# Patient Record
Sex: MALE | Race: WHITE
[De-identification: names, ages, dates, MRNs, and addresses within clinical notes are randomized per-mention and may not be internally consistent; named-entity substitution may affect disease eponyms.]

---

## 2020-10-11 ENCOUNTER — HOSPITAL ENCOUNTER (EMERGENCY)
Dept: HOSPITAL 41 - JD.ED | Age: 60
Discharge: HOME | End: 2020-10-11
Payer: COMMERCIAL

## 2020-10-11 DIAGNOSIS — E66.9: ICD-10-CM

## 2020-10-11 DIAGNOSIS — I10: ICD-10-CM

## 2020-10-11 DIAGNOSIS — F41.9: Primary | ICD-10-CM

## 2020-10-11 DIAGNOSIS — R63.0: ICD-10-CM

## 2020-10-11 DIAGNOSIS — D64.89: ICD-10-CM

## 2020-10-11 PROCEDURE — 96374 THER/PROPH/DIAG INJ IV PUSH: CPT

## 2020-10-11 PROCEDURE — 93005 ELECTROCARDIOGRAM TRACING: CPT

## 2020-10-11 PROCEDURE — 99283 EMERGENCY DEPT VISIT LOW MDM: CPT

## 2020-10-11 PROCEDURE — 80053 COMPREHEN METABOLIC PANEL: CPT

## 2020-10-11 PROCEDURE — 83690 ASSAY OF LIPASE: CPT

## 2020-10-11 PROCEDURE — 36415 COLL VENOUS BLD VENIPUNCTURE: CPT

## 2020-10-11 PROCEDURE — 85025 COMPLETE CBC W/AUTO DIFF WBC: CPT

## 2020-10-11 NOTE — EDM.PDOC
ED HPI GENERAL MEDICAL PROBLEM





- General


Chief Complaint: General


Stated Complaint: FEELS LIKE BODY IS RACING/UNABLE TO EAT


Time Seen by Provider: 10/11/20 02:45


Source of Information: Reports: Patient


History Limitations: Reports: No Limitations





- History of Present Illness


INITIAL COMMENTS - FREE TEXT/NARRATIVE: 





The patient presents with anxiety and loss of appetite.  The patient has been 

doctoring with Jazmin Mcnally and she did a CT scan and there was evidence of 

extensive malignancy involving the lung, liver and lymph nodes.  Lymphoma and 

pancreatic cancers were on the differentials.  The patient is going to see an 

oncologist but he has not found out who or when yet.  He has not been able to 

eat.  He says he gets full with just a few bites and he may be a little nause

ous.  He has no fever, chills, cough, chest pain or shortness of breath.  He 

says he feels like his body is racing.  He feels a little anxious.


Onset: Gradual


Duration: Day(s):


Severity: Moderate


Improves with: Reports: None


Worsens with: Reports: None


Associated Symptoms: Reports: Loss of Appetite.  Denies: Chest Pain, Cough, 

Fever/Chills, Headaches, Nausea/Vomiting, Shortness of Breath





- Related Data


                                    Allergies











Allergy/AdvReac Type Severity Reaction Status Date / Time


 


No Known Allergies Allergy   Verified 04/09/19 11:32











Home Meds: 


                                    Home Meds





. [No Known Home Meds]  04/09/19 [History]











Past Medical History


HEENT History: Reports: Impaired Vision


Cardiovascular History: Reports: Hypertension


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 

excised)


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 

aprox 16 years ago.


Endocrine/Metabolic History: Reports: Obesity/BMI 30+





- Past Surgical History


Head Surgeries/Procedures: Reports: Other (See Below)


HEENT Surgical History: Reports: Oral Surgery





Social & Family History





- Family History


Family Medical History: Noncontributory





- Tobacco Use


Smoking Status *Q: Never Smoker





- Caffeine Use


Caffeine Use: Reports: None





- Recreational Drug Use


Recreational Drug Use: No





- Living Situation & Occupation


Living situation: Reports: Alone, Single


Occupation: Employed (Fills tanks)





ED ROS GENERAL





- Review of Systems


Review Of Systems: See Below


Constitutional: Reports: No Symptoms


HEENT: Reports: No Symptoms


Respiratory: Reports: No Symptoms


Cardiovascular: Reports: No Symptoms


Endocrine: Reports: No Symptoms


GI/Abdominal: Reports: Decreased Appetite, Nausea.  Denies: Abdominal Pain, 

Vomiting


: Reports: No Symptoms





ED EXAM, GENERAL





- Physical Exam


Exam: See Below


Exam Limited By: No Limitations


General Appearance: Alert, No Apparent Distress


Ears: Normal External Exam


Nose: Normal Inspection


Head: Atraumatic, Normocephalic


Neck: Normal Inspection


Respiratory/Chest: No Respiratory Distress, Lungs Clear, Normal Breath Sounds


Cardiovascular: Regular Rate, Rhythm, No Edema, No Murmur


GI/Abdominal: Soft, Non-Tender, No Organomegaly, No Mass


Back Exam: Normal Inspection


Extremities: Normal Inspection





EKG INTERPRETATION


EKG Date: 10/11/20


Time: 03:05


Rhythm: Other (sinus tachycardia)


Rate (Beats/Min): 111


Axis: Normal


P-Wave: Present


QRS: Normal


ST-T: Normal


QT: Normal





Course





- Vital Signs


Last Recorded V/S: 


                                Last Vital Signs











Temp  99.3 F   10/11/20 02:28


 


Pulse  120 H  10/11/20 02:28


 


Resp  16   10/11/20 02:28


 


BP  142/84 H  10/11/20 02:28


 


Pulse Ox  94 L  10/11/20 02:28














- Orders/Labs/Meds


Orders: 


                               Active Orders 24 hr











 Category Date Time Status


 


 EKG Documentation Completion [RC] STAT Care  10/11/20 02:52 Active


 


 ED Antiemetic Medication Reflex [OM.PC] Stat Oth  10/11/20 02:52 Ordered











Labs: 


                                Laboratory Tests











  10/11/20 10/11/20 Range/Units





  03:00 03:00 


 


WBC  13.60 H   (4.23-9.07)  K/mm3


 


RBC  4.18 L   (4.63-6.08)  M/mm3


 


Hgb  9.9 L D   (13.7-17.5)  gm/dl


 


Hct  31.6 L   (40.1-51.0)  %


 


MCV  75.6 L   (79.0-92.2)  fl


 


MCH  23.7 L   (25.7-32.2)  pg


 


MCHC  31.3 L   (32.2-35.5)  g/dl


 


RDW Std Deviation  45.3 H   (35.1-43.9)  fL


 


Plt Count  452 H D   (163-337)  K/mm3


 


MPV  8.6 L   (9.4-12.3)  fl


 


Neut % (Auto)  77.9 H   (34.0-67.9)  %


 


Lymph % (Auto)  7.3 L   (21.8-53.1)  %


 


Mono % (Auto)  13.2 H   (5.3-12.2)  %


 


Eos % (Auto)  0.9   (0.8-7.0)  


 


Baso % (Auto)  0.4   (0.1-1.2)  %


 


Neut # (Auto)  10.59 H   (1.78-5.38)  K/mm3


 


Lymph # (Auto)  0.99 L   (1.32-3.57)  K/mm3


 


Mono # (Auto)  1.80 H   (0.30-0.82)  K/mm3


 


Eos # (Auto)  0.12   (0.04-0.54)  K/mm3


 


Baso # (Auto)  0.06   (0.01-0.08)  K/mm3


 


Manual Slide Review  Abnormal smear   


 


Sodium   129 L  (136-145)  mEq/L


 


Potassium   3.7  (3.5-5.1)  mEq/L


 


Chloride   96 L  ()  mEq/L


 


Carbon Dioxide   20 L  (21-32)  mEq/L


 


Anion Gap   16.7 H  (5-15)  


 


BUN   13  (7-18)  mg/dL


 


Creatinine   1.0  (0.7-1.3)  mg/dL


 


Est Cr Clr Drug Dosing   82.13  mL/min


 


Estimated GFR (MDRD)   > 60  (>60)  mL/min


 


BUN/Creatinine Ratio   13.0 L  (14-18)  


 


Glucose   187 H  ()  mg/dL


 


Calcium   8.3 L  (8.5-10.1)  mg/dL


 


Total Bilirubin   0.5  (0.2-1.0)  mg/dL


 


AST   76 H  (15-37)  U/L


 


ALT   65 H  (16-63)  U/L


 


Alkaline Phosphatase   119 H  ()  U/L


 


Total Protein   7.2  (6.4-8.2)  g/dl


 


Albumin   1.7 L  (3.4-5.0)  g/dl


 


Globulin   5.5  gm/dL


 


Albumin/Globulin Ratio   0.3 L  (1-2)  


 


Lipase   156  ()  U/L











Meds: 


Medications














Discontinued Medications














Generic Name Dose Route Start Last Admin





  Trade Name Freq  PRN Reason Stop Dose Admin


 


Lorazepam  1 mg  10/11/20 02:53  10/11/20 02:59





  Ativan  PO  10/11/20 02:54  1 mg





  ONETIME ONE   Administration


 


Ondansetron HCl  4 mg  10/11/20 02:52  10/11/20 02:59





  Zofran  IVPUSH  10/11/20 02:53  4 mg





  ONETIME ONE   Administration














- Re-Assessments/Exams


Free Text/Narrative Re-Assessment/Exam: 





10/11/20 04:00


I ordered an EKG, labs and zofran.  His EKG shows a sinus tachycardia.  His WBC 

was elevated at 13.6.  His Hgb was low at 9.9.  His platelets were elevated at 

452.  His Na was low at 129.  His anion gap was elevated at 16.7.  His glucose 

was elevated at 187.  His AST is elevated at 76.  His ALT is elevated at 65.  

His alk phos is elevated at 119.  His lipase is normal.





Departure





- Departure


Time of Disposition: 04:15


Disposition: Home, Self-Care 01


Condition: Good


Clinical Impression: 


 Decreased appetite, Anxiety





Anemia


Qualifiers:


 Anemia type: other cause Other causes of anemia: other cause, not classified 

Qualified Code(s): D64.89 - Other specified anemias





Leukocytosis


Qualifiers:


 Leukocytosis type: unspecified Qualified Code(s): D72.829 - Elevated white 

blood cell count, unspecified








- Discharge Information


*PRESCRIPTION DRUG MONITORING PROGRAM REVIEWED*: Not Applicable


*COPY OF PRESCRIPTION DRUG MONITORING REPORT IN PATIENT TAMMY: Not Applicable


Referrals: 


Jazmin Mcnally PA-C [Primary Care Provider] - 1 Week


Forms:  ED Department Discharge


Additional Instructions: 


Take the zofran ever 6 hours as needed for nausea and it may help with your 

appetite.  Take the ativan every 8 hours as needed for anxiety.  Follow up with 

Jazmin Mcnally.  Please return if you are worse.





Sepsis Event Note (ED)





- Evaluation


Sepsis Screening Result: No Definite Risk





- Focused Exam


Vital Signs: 


                                   Vital Signs











  Temp Pulse Resp BP Pulse Ox


 


 10/11/20 02:28  99.3 F  120 H  16  142/84 H  94 L














- My Orders


Last 24 Hours: 


My Active Orders





10/11/20 02:52


EKG Documentation Completion [RC] STAT 


ED Antiemetic Medication Reflex [OM.PC] Stat 














- Assessment/Plan


Last 24 Hours: 


My Active Orders





10/11/20 02:52


EKG Documentation Completion [RC] STAT 


ED Antiemetic Medication Reflex [OM.PC] Stat

## 2020-11-10 ENCOUNTER — HOSPITAL ENCOUNTER (EMERGENCY)
Dept: HOSPITAL 41 - JD.ED | Age: 60
LOS: 1 days | Discharge: HOME | End: 2020-11-11
Payer: COMMERCIAL
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
## 2020-11-10 DIAGNOSIS — S01.01XA: Primary | ICD-10-CM

## 2020-11-10 DIAGNOSIS — Z87.891: ICD-10-CM

## 2020-11-10 DIAGNOSIS — Z79.899: ICD-10-CM

## 2020-11-10 DIAGNOSIS — I10: ICD-10-CM

## 2020-11-10 DIAGNOSIS — Z23: ICD-10-CM

## 2020-11-10 DIAGNOSIS — Z79.82: ICD-10-CM

## 2020-11-10 DIAGNOSIS — E78.00: ICD-10-CM

## 2020-11-10 PROCEDURE — 90715 TDAP VACCINE 7 YRS/> IM: CPT

## 2020-11-10 PROCEDURE — 99282 EMERGENCY DEPT VISIT SF MDM: CPT

## 2020-11-10 PROCEDURE — 90471 IMMUNIZATION ADMIN: CPT

## 2020-11-10 PROCEDURE — 12002 RPR S/N/AX/GEN/TRNK2.6-7.5CM: CPT

## 2020-11-10 NOTE — EDM.PDOC
ED HPI GENERAL MEDICAL PROBLEM





- General


Chief Complaint: Laceration


Stated Complaint: FELL OUT OF BED/HEAD LAC


Time Seen by Provider: 11/10/20 23:05


Source of Information: Reports: Patient


History Limitations: Reports: No Limitations





- History of Present Illness


INITIAL COMMENTS - FREE TEXT/NARRATIVE: 





Mr. Adan is a pleasant 60-year-old gentleman who now presents to the ED after 

sustaining a laceration to his left scalp 1/22/2020 tonight, when he 

accidentally fell when he was getting out of bed.  He states that he is being 

prepped for colonoscopy tomorrow, and that he needed to go to the bathroom.  He 

states that he was simply moving too quickly, and fell.  There was no loss of 

consciousness, and the patient is otherwise uninjured.





Here in the ED, the patient is initially found to be tachycardic at 134 bpm, 

otherwise, he is hemodynamically stable, afebrile, saturating 95% on room air.





Other than tonight's scalp injury, the patient denies having a recent fever, c

hills, sore throat, ear pain, nasal or sinus congestion, cough, dyspnea, chest 

pain, palpitations, nausea, vomiting, constipation, diarrhea, abdominal pain, 

urinary symptoms, recent weight gain or weight loss, recent bloody bowel 

movements or black bowel movements, recent joint aches, headaches, or rashes.








The patient's PCP is BEVERLY Nguyen.


His Surgeon is Dr. marita Amaro.


His Oncologist is Dr. Herrera Salazar.








  ** Head


Pain Score (Numeric/FACES): 2





- Related Data


                                    Allergies











Allergy/AdvReac Type Severity Reaction Status Date / Time


 


No Known Allergies Allergy   Verified 11/10/20 23:05











Home Meds: 


                                    Home Meds





Aspirin 81 mg PO DAILY 11/10/20 [History]


Latanoprost [Xalatan 0.005% Ophth Soln] 1 drop EYEBOTH BEDTIME 11/10/20 

[History]


Metoprolol Succinate 100 mg PO BEDTIME 11/10/20 [History]


atorvaSTATin [Lipitor] 80 mg PO BEDTIME 11/10/20 [History]











Past Medical History


HEENT History: Reports: Impaired Vision


Cardiovascular History: Reports: High Cholesterol, Hypertension


Dermatologic History: Reports: Other (See Below) (Cancer of undetermined 

etiology)





- Past Surgical History


Head Surgeries/Procedures: Reports: Other (See Below) (Benign brainstem tumor 

excised in 2003 or 2004)


HEENT Surgical History: Reports: Oral Surgery (dental extractions)


Musculoskeletal Surgical History: Reports: Other (See Below) (Right Achilles 

tendon repair)





Social & Family History





- Family History


Family Medical History: No Pertinent Family History





- Tobacco Use


Tobacco Use Status *Q: Former Tobacco User


Years of Tobacco use: 36


Packs/Tins Daily: 0.3


Month/Year Tobacco Last Used: Quit Oct 2020





- Caffeine Use


Caffeine Use: Reports: None





- Alcohol Use


Alcohol Use History: Yes


Alcohol Use Frequency: Socially





- Recreational Drug Use


Recreational Drug Use: No





- Living Situation & Occupation


Living situation: Reports: Alone, Single


Occupation: Unemployed





ED ROS GENERAL





- Review of Systems


Review Of Systems: Comprehensive ROS is negative, except as noted in HPI.





ED EXAM, SKIN/RASH


Exam: See Below


Exam Limited By: No Limitations


General Appearance: Alert, WD/WN, No Apparent Distress


Eye Exam: Bilateral Eye: EOMI, Normal Inspection


Ears: Normal External Exam, Hearing Grossly Normal


Nose: Normal Inspection


Throat/Mouth: Normal Inspection, Normal Voice, No Airway Compromise


Head: Normocephalic, Other (5 cm "L"-shaped laceration to the patinet's 

posterior left scalp with no associated hematoma.  The wound appears to be full-

thickness, but is not currently bleeding.)


Neck: Normal Inspection, Full Range of Motion





ED SKIN PROCEDURES





- Laceration/Wound Repair


  ** Left Head


Appearance: Subcutaneous, Irregular, Clean


Skin Prep: Saline


Exploration/Debridement/Repair: Wound Explored, In a Bloodless Field, Explored 

to Base, No Foreign Material Found


Closed with: Conner


Lac/Wound length In cm: 5


# of Sutures: 7


Drain Placement: No


Tetanus Status Addressed: Yes


Complications: No





Course





- Vital Signs


Last Recorded V/S: 


                                Last Vital Signs











Temp  36.6 C   11/10/20 23:03


 


Pulse  134 H  11/10/20 23:03


 


Resp  18   11/10/20 23:03


 


BP  130/82   11/10/20 23:03


 


Pulse Ox  95   11/10/20 23:03








                                        





Orthostatic Blood Pressure [     93/64


Standing]                        


Orthostatic Blood Pressure [     98/70


Sitting]                         


Orthostatic Blood Pressure [     102/61


Supine]                          











- Orders/Labs/Meds


Meds: 


Medications














Discontinued Medications














Generic Name Dose Route Start Last Admin





  Trade Name Freq  PRN Reason Stop Dose Admin


 


Diphtheria/Tetanus/Acell Pertussis  0.5 ml  11/10/20 23:24  11/10/20 23:55





  Adacel  IM  11/10/20 23:25  0.5 ml





  .ONCE ONE   Administration


 


Sodium Chloride  1,000 mls @ 999 mls/hr  11/10/20 23:28  11/10/20 23:55





  Normal Saline  IV  11/11/20 00:28  999 mls/hr





  ONETIME ONE   Administration














- Re-Assessments/Exams


Free Text/Narrative Re-Assessment/Exam: 





11/10/20 23:16


As above, the patient fell after getting out of bed around 22:20 this evening, 

striking the left side of his head on a bedside table, sustaining a laceration. 

No loss of consciousness.  He has an approximately 5 cm "L"-shaped laceration to

his left scalp that will require conner after Yaw RN cleans the wound.  No 

other injuries.





Due to these patient's baseline tachycardia, I have asked Yaw DENNEY to check the

patient's orthostatics.








11/10/20 23:21


I placed 7 staples across the wound.  The patient tolerated the procedure very 

well.








11/10/20 23:28


The patient is orthostatic.  I have ordered 1 L of IV fluid, to be followed by 

repeat orthostatics.








11/11/20 01:42


Following 1 L of IV fluid, the patient is no longer orthostatic.  I will 

discharge him home.





Departure





- Departure


Time of Disposition: 01:42


Disposition: Home, Self-Care 01


Condition: Good


Clinical Impression: 


 Scalp laceration, Orthostasis








- Discharge Information


*PRESCRIPTION DRUG MONITORING PROGRAM REVIEWED*: Not Applicable


*COPY OF PRESCRIPTION DRUG MONITORING REPORT IN PATIENT TAMMY: Not Applicable


Instructions:  Orthostatic Hypotension, Laceration Care, Adult, Easy-to-Read


Referrals: 


Jazmin Mcnally PA-C [Primary Care Provider] - 


Marita Amaro MD [Physician] - 


Herrera Salazar MD [Ordering Only Provider] - 


Forms:  ED Department Discharge


Additional Instructions: 


You were seen in the emergency room after getting out of bed, falling, and 

cutting the left side of your head.





Your wound was closed with 7 staples.





Keep the wound clean with ordinary shampoo and water when you bathe.  Do not 

apply antibiotic ointment.





The staples should be ready for removal by Friday, 11/20/2020.  They can be 

removed at the walk-in clinic, by a nurse at your doctor's office, or in the ER.





Your heart rate was found to be elevated.  Your blood pressure was checked both 

lying and standing, finding that it felt excessively between lying and standing,

a condition known as orthostasis.





You were given 1 L of IV fluid, and your blood pressures normalized.





Going forward, we recommend that you stay adequately hydrated.





You may proceed with your scheduled colonoscopy today.





If any other problems, please do not hesitate to return to the ER.








**You were given a tetanus vaccination during your ER visit.**





Sepsis Event Note (ED)





- Evaluation


Sepsis Screening Result: No Definite Risk





- Focused Exam


Vital Signs: 


                                   Vital Signs











  Temp Pulse Resp BP Pulse Ox


 


 11/10/20 23:03  36.6 C  134 H  18  130/82  95

## 2020-11-11 ENCOUNTER — HOSPITAL ENCOUNTER (OUTPATIENT)
Dept: HOSPITAL 41 - JD.SDS | Age: 60
Discharge: HOME | End: 2020-11-11
Attending: SURGERY
Payer: COMMERCIAL

## 2020-11-11 DIAGNOSIS — I85.00: ICD-10-CM

## 2020-11-11 DIAGNOSIS — Z79.899: ICD-10-CM

## 2020-11-11 DIAGNOSIS — K20.90: ICD-10-CM

## 2020-11-11 DIAGNOSIS — D12.5: Primary | ICD-10-CM

## 2020-11-11 DIAGNOSIS — Z98.890: ICD-10-CM

## 2020-11-11 DIAGNOSIS — E78.00: ICD-10-CM

## 2020-11-11 DIAGNOSIS — I10: ICD-10-CM

## 2020-11-11 DIAGNOSIS — K64.1: ICD-10-CM

## 2020-11-11 DIAGNOSIS — Z79.82: ICD-10-CM

## 2020-11-11 PROCEDURE — 45385 COLONOSCOPY W/LESION REMOVAL: CPT

## 2020-11-11 PROCEDURE — 45380 COLONOSCOPY AND BIOPSY: CPT

## 2020-11-11 PROCEDURE — 43235 EGD DIAGNOSTIC BRUSH WASH: CPT

## 2020-11-11 NOTE — PCM.PREANE
Preanesthetic Assessment





- Procedure


Proposed Procedure: 


EGD and Colonoscopy








- Anesthesia/Transfusion/Family Hx


Anesthesia History: Prior Anesthesia Without Reaction





- Review of Systems


General: Weakness


Pulmonary: No Symptoms


Cardiovascular: No Symptoms


Gastrointestinal: No Symptoms


Neurological: Other (Recent (11/10/20) fall with left upper parietal area scalp 

cut, closed with staples, seen in ED )


Other: Reports: None





- Physical Assessment


NPO Status Date: 11/10/20


NPO Status Time: 23:50


Vital Signs: 





                                Last Vital Signs











Temp  97.9 F   11/11/20 11:25


 


Pulse  115 H  11/11/20 11:25


 


Resp  28 H  11/11/20 11:25


 


BP  111/65   11/11/20 11:25


 


Pulse Ox  95   11/11/20 11:25











Height: 1.78 m


Weight: 88.451 kg


ASA Class: 3


Mental Status: Alert & Oriented x3


Airway Class: Mallampati = 3


Dentition: Reports: Hartley(s), Bridge


Thyro-Mental Finger Breadths: 3


Mouth Opening Finger Breadths: 3


ROM/Head Extension: Full


Lungs: Clear to Auscultation, Normal Respiratory Effort


Cardiovascular: Regular Rate, Regular Rhythm





- Allergies


Allergies/Adverse Reactions: 


                                    Allergies











Allergy/AdvReac Type Severity Reaction Status Date / Time


 


No Known Allergies Allergy   Verified 11/10/20 23:05














- Anesthesia Plan


Beta Blocker: Metoprolol


Med Last Dose Date: 11/10/20


Med Last Dose Time: 18:00





- Acknowledgements


Anesthesia Type Planned: MAC


Pt an Appropriate Candidate for the Planned Anesthesia: Yes


Alternatives and Risks of Anesthesia Discussed w Pt/Guardian: Yes


Pt/Guardian Understands and Agrees with Anesthesia Plan: Yes





PreAnesthesia Questionnaire


HEENT History: Reports: Impaired Vision


Cardiovascular History: Reports: High Cholesterol, Hypertension


Gastrointestinal History: Reports: None, Other (See Below)


Other Gastrointestinal History: Pancreatic Cancer


Neurological History: Reports: Other (See Below)


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 

aprox 16 years ago.


Psychiatric History: Reports: None


Endocrine/Metabolic History: Reports: Obesity/BMI 30+


Hematologic History: Reports: None


Immunologic History: Reports: None


Oncologic (Cancer) History: Reports: Pancreatic


Dermatologic History: Reports: Other (See Below) (Cancer of undetermined 

etiology)


Other Dermatologic History: cyst with excision





- Infectious Disease History


Infectious Disease History: Reports: None





- Past Surgical History


Head Surgeries/Procedures: Reports: Other (See Below) (Benign brainstem tumor 

excised in 2003 or 2004)


HEENT Surgical History: Reports: Oral Surgery (dental extractions)


Musculoskeletal Surgical History: Reports: Other (See Below) (Right Achilles 

tendon repair)





- SUBSTANCE USE


Tobacco Use Status *Q: Never Tobacco User


Recreational Drug Use History: No





- HOME MEDS


Home Medications: 


                                    Home Meds





Aspirin 81 mg PO DAILY 11/10/20 [History]


Latanoprost [Xalatan 0.005% Ophth Soln] 1 drop EYEBOTH BEDTIME 11/10/20 

[History]


Metoprolol Succinate 100 mg PO BEDTIME 11/10/20 [History]


atorvaSTATin [Lipitor] 80 mg PO BEDTIME 11/10/20 [History]











- CURRENT (IN HOUSE) MEDS


Current Meds: 





                               Current Medications





Lactated Ringer's (Ringers, Lactated)  1,000 mls @ 125 mls/hr IV ASDIRECTED TELLY


   Stop: 11/11/20 23:00


   Last Admin: 11/11/20 11:50 Dose:  125 mls/hr


   Documented by: 


Lidocaine/Sodium Bicarbonate (Buffered Lidocaine 1% In Ns 8.4%)  0.25 ml [email or web address] 

ONETIME PRN


   PRN Reason: Prior to IV Start


   Stop: 11/11/20 18:00


   Last Admin: 11/11/20 11:50 Dose:  0.25 ml


   Documented by: 


Sodium Chloride (Saline Flush)  10 ml FLUSH ASDIRECTED PRN


   PRN Reason: Keep Vein Open


   Stop: 11/11/20 18:00

## 2020-11-11 NOTE — PROC
DATE OF OPERATION:  11/11/2020

 

SURGEON:  Deepali Amaro MD

 

PREOPERATIVE DIAGNOSIS:

Intraabdominal adenocarcinoma.

 

POSTOPERATIVE DIAGNOSIS:

1. No malignant appearing changes in the stomach or colon.

2. Gastric varices

 

PROCEDURES:

1. Esophagogastroduodenoscopy.

2. Colonoscopy.

 

ANESTHESIA:

Monitored anesthesia care.

 

ESTIMATED BLOOD LOSS:

Minimal.

 

COMPLICATIONS:

None.

 

INDICATIONS AND CONSENT:

The patient is a 60-year-old male, who has an intraabdominal adenocarcinoma,

likely from the pancreas.  This has diffuse inflammation around it and seemed to

be in close proximity to the stomach and the colon; therefore, prior to

treatment, Dr. Salazar, who is his medical oncologist, wanted to make sure there is

no involvement of the stomach or the colon; therefore, he asked for a

colonoscopy and an EGD.  I saw the patient in clinic and offered him the

procedures.  We discussed his risks, benefits, and alternatives, and informed

consent was obtained.

 

DETAILS OF THE PROCEDURE:

The patient was taken to the procedure room and placed in the left lateral

decubitus position.  Following induction of monitored anesthesia care, a time-

out was performed, and we began with an EGD.  The scope was inserted into the

mouth and into the esophagus.  The esophagus was normal all the way down to the

distal esophagus.  There was a mild amount of inflammation in the distal

esophagus and GE junction.  This was easily traversed into the stomach.  There

were no ulcers or areas that were concerning for malignancy in the stomach.  The

cardia and the fundus had a significant amount of varices that

were non-bleeding.  It is unclear where this was coming from.  The distal

stomach and antrum appeared normal.  I went into the duodenum.  The pyloric

channel, bulb, and first and second portions of the duodenum all appeared

normal.  At this point, we withdrew the scope back into the stomach.  On

retroflexion, there was no hiatal hernia.  Again, varicose veins were

visualized.  Air was withdrawn, and the scope was withdrawn.  This concluded the

EGD portion of the procedure.  We turned our attention to the colonoscopy.

Perianal exam and digital rectal exams were significant for hemorrhoids but no

other abnormalities.  The scope was inserted and taken all the way to the cecum.

The appendiceal orifice and ileocecal valve were photographed.  There was an

amount of adhesive fluid in the colon that was irrigated clear.  Then, we

withdrew the scope slowly, looking for any polyps.  There were no polyps, except

in the sigmoid colon, there was a 1 cm pedunculated polyp that was removed with 
a hot snare

as well as another 2 mm semi-sessile polyp that was removed with a Jumbo 
forceps.  EBL was

minimal.  Both polyps were removed completely.  Other than that, the entire

colon appeared to be normal.  On retroflexion, there were grade 2 hemorrhoids in

the rectum.  Air was suctioned out, and the scope was concluded.  Therefore,

based on this exam, there is no concern for involvement of the mass in the colon

or his stomach and the first and second portion of the duodenum.

 

DD:  11/11/2020 16:55:03

DT:  11/11/2020 17:39:46  MMODAL

Job #:  [id number]

ULISES

## 2020-11-11 NOTE — [id number]
Post Anesthesia Note





- EVALUATION WITHIN 48HRS OF ANESTHETIC


Vital Signs in Normal Range: Yes


Patient Participated in Evaluation: Yes


Respiratory Function Stable: Yes


Airway Patent: Yes


Cardiovascular Function Stable: Yes


Hydration Status Stable: Yes


Pain Control Satisfactory: Yes


Nausea and Vomiting Control Satisfactory: Yes


Mental Status Recovered: Yes


Vital Signs: 


                                Last Vital Signs











Temp  97.9 F   11/11/20 11:25


 


Pulse  115 H  11/11/20 11:25


 


Resp  28 H  11/11/20 11:25


 


BP  111/65   11/11/20 11:25


 


Pulse Ox  95   11/11/20 11:25








1558


/96


22


97.9


96%


102/59

## 2020-11-21 ENCOUNTER — HOSPITAL ENCOUNTER (EMERGENCY)
Dept: HOSPITAL 41 - JD.ED | Age: 60
LOS: 1 days | Discharge: NURSING, REHAB OR LONG-TERM CARE | End: 2020-11-22
Payer: COMMERCIAL

## 2020-11-21 DIAGNOSIS — E86.0: ICD-10-CM

## 2020-11-21 DIAGNOSIS — D72.829: ICD-10-CM

## 2020-11-21 DIAGNOSIS — J18.9: Primary | ICD-10-CM

## 2020-11-21 DIAGNOSIS — Z20.828: ICD-10-CM

## 2020-11-21 DIAGNOSIS — E66.9: ICD-10-CM

## 2020-11-21 DIAGNOSIS — C80.0: ICD-10-CM

## 2020-11-21 DIAGNOSIS — I10: ICD-10-CM

## 2020-11-21 DIAGNOSIS — E78.00: ICD-10-CM

## 2020-11-21 DIAGNOSIS — C78.1: ICD-10-CM

## 2020-11-21 DIAGNOSIS — I95.1: ICD-10-CM

## 2020-11-21 DIAGNOSIS — R79.89: ICD-10-CM

## 2020-11-21 DIAGNOSIS — Z79.899: ICD-10-CM

## 2020-11-21 PROCEDURE — 87635 SARS-COV-2 COVID-19 AMP PRB: CPT

## 2020-11-21 PROCEDURE — 82728 ASSAY OF FERRITIN: CPT

## 2020-11-21 PROCEDURE — 85025 COMPLETE CBC W/AUTO DIFF WBC: CPT

## 2020-11-21 PROCEDURE — 87040 BLOOD CULTURE FOR BACTERIA: CPT

## 2020-11-21 PROCEDURE — 93005 ELECTROCARDIOGRAM TRACING: CPT

## 2020-11-21 PROCEDURE — 83615 LACTATE (LD) (LDH) ENZYME: CPT

## 2020-11-21 PROCEDURE — 71045 X-RAY EXAM CHEST 1 VIEW: CPT

## 2020-11-21 PROCEDURE — 96365 THER/PROPH/DIAG IV INF INIT: CPT

## 2020-11-21 PROCEDURE — U0002 COVID-19 LAB TEST NON-CDC: HCPCS

## 2020-11-21 PROCEDURE — 82803 BLOOD GASES ANY COMBINATION: CPT

## 2020-11-21 PROCEDURE — 84484 ASSAY OF TROPONIN QUANT: CPT

## 2020-11-21 PROCEDURE — 80053 COMPREHEN METABOLIC PANEL: CPT

## 2020-11-21 PROCEDURE — 86140 C-REACTIVE PROTEIN: CPT

## 2020-11-21 PROCEDURE — 85610 PROTHROMBIN TIME: CPT

## 2020-11-21 PROCEDURE — 83605 ASSAY OF LACTIC ACID: CPT

## 2020-11-21 PROCEDURE — 99285 EMERGENCY DEPT VISIT HI MDM: CPT

## 2020-11-21 PROCEDURE — 36415 COLL VENOUS BLD VENIPUNCTURE: CPT

## 2020-11-21 PROCEDURE — 36600 WITHDRAWAL OF ARTERIAL BLOOD: CPT

## 2020-11-21 NOTE — EDM.PDOC
ED HPI GENERAL MEDICAL PROBLEM





- General


Chief Complaint: Respiratory Problem


Stated Complaint: [facility]


Time Seen by Provider: 11/21/20 19:50


Source of Information: Reports: Patient


History Limitations: Reports: No Limitations





- History of Present Illness


INITIAL COMMENTS - FREE TEXT/NARRATIVE: 





Evening he had a weak spell in which he lowered himself to the floor but did not

fall down.  He is complaining of shortness of breath and is complaining of 

severe fatigue.  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 

was diagnosed last month.  He has a diffuse malignancy in the intraperitoneal 

lungs mediastinum liver and retroperitoneal lymph nodes.  This is by a CT scan 

that was done in October.  He does not appear to have mets to the bone pancreas 

kidney colon or prostate.  He is due to get a port placed in his chest on Monday

but he is not certain when chemotherapy supposed to begin.  He has a history of 

anemia hypertension anxiety coronary artery disease and gastric varices.  To the

ER simply for the weak spells shortness of breath and fatigue.





I spoke to the patient regarding if his heart were to stop what he desired to 

have us do compressions on his chest and put a tube in his lungs to breathe for 

him in order to bring him back.  The patient states he does not want any of 

this.  Therefore he is a DNR.





These note he has had 3 Covid test recently October 15 that was negative, 

November 7 that was negative and most recently November 19 that was also 

negative.





- Related Data


                                    Allergies











Allergy/AdvReac Type Severity Reaction Status Date / Time


 


No Known Allergies Allergy   Verified 11/21/20 19:57











Home Meds: 


                                    Home Meds





Latanoprost [Xalatan 0.005% Ophth Soln] 1 drop EYEBOTH BEDTIME 11/10/20 

[History]


Metoprolol Succinate 100 mg PO BEDTIME 11/10/20 [History]


atorvaSTATin [Lipitor] 80 mg PO BEDTIME 11/10/20 [History]











Past Medical History


HEENT History: Reports: Impaired Vision


Cardiovascular History: Reports: High Cholesterol, Hypertension


Respiratory History: Reports: None


Gastrointestinal History: Reports: None


Other Gastrointestinal History: Pancreatic Cancer


Genitourinary History: Reports: None


OB/GYN History: Reports: None


Musculoskeletal History: Reports: None


Neurological History: Reports: Other (See Below)


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 

aprox 16 years ago.


Psychiatric History: Reports: None


Endocrine/Metabolic History: Reports: Obesity/BMI 30+


Hematologic History: Reports: None


Immunologic History: Reports: None


Oncologic (Cancer) History: Reports: Other (See Below)


Other Oncologic History: "cancer that mmoves all over"


Dermatologic History: Reports: Other (See Below)


Other Dermatologic History: cyst with excision





- Infectious Disease History


Infectious Disease History: Reports: None





- Past Surgical History


HEENT Surgical History: Reports: Oral Surgery


Cardiovascular Surgical History: Reports: None


Respiratory Surgical History: Reports: None


GI Surgical History: Reports: None


Male  Surgical History: Reports: None


Endocrine Surgical History: Reports: None


Musculoskeletal Surgical History: Reports: Other (See Below)


Other Musculoskeletal Surgeries/Procedures:: achilles tendon repair


Oncologic Surgical History: Reports: None





Social & Family History





- Family History


Family Medical History: No Pertinent Family History





- Tobacco Use


Tobacco Use Status *Q: Never Tobacco User


Second Hand Smoke Exposure: No





- Caffeine Use


Caffeine Use: Reports: None





- Recreational Drug Use


Recreational Drug Use: No





- Living Situation & Occupation


Living situation: Reports: Alone, Single


Occupation: Unemployed





ED ROS GENERAL





- Review of Systems


Review Of Systems: See Below


Constitutional: Reports: Weakness, Fatigue.  Denies: Fever, Chills


HEENT: Reports: No Symptoms


Respiratory: Reports: Shortness of Breath, Wheezing, Cough


Cardiovascular: Denies: Chest Pain


Endocrine: Reports: No Symptoms


GI/Abdominal: Denies: Abdominal Pain, Diarrhea, Nausea, Vomiting


: Reports: No Symptoms


Musculoskeletal: Reports: No Symptoms


Skin: Reports: No Symptoms


Neurological: Reports: No Symptoms


Psychiatric: Reports: No Symptoms


Hematologic/Lymphatic: Reports: Anemia





ED EXAM, GENERAL





- Physical Exam


Exam: See Below


Free Text/Narrative:: 





Noted by the nurses to have orthostatic vital signs.


Exam Limited By: No Limitations


General Appearance: Alert, WD/WN, Anxious, Mild Distress, Obese


Eye Exam: Bilateral Eye: Normal Inspection


Ears: Normal External Exam, Normal Canal, Normal TMs


Nose: Normal Inspection


Throat/Mouth: Normal Inspection, Normal Lips, Normal Oropharynx, Normal Voice, 

No Airway Compromise, Other (Mucous membranes are dry.)


Head: Normocephalic


Neck: Supple


Respiratory/Chest: Normal Breath Sounds, Other (And appears to be short of 

breath, when I listen to his lung fields he does have crackles in the right base

 and right anterior chest and decreased breath sounds in the left chest.)


Cardiovascular: Regular Rate, Rhythm, No Murmur, Tachycardia


GI/Abdominal: Soft, Non-Tender, Other (Not feel a splash of ascites on palpation

 of his abdomen, he denies any significant pain of his abdomen on palpation.)


Back Exam: Full Range of Motion


Extremities: Normal Inspection, Normal Range of Motion


Neurological: Alert, Oriented, Other (Patient appears to be confused at times 

though he is answering questions fairly appropriately.  He is a little creased 

in his memory ability on recent events.)


Psychiatric: Anxious


Skin Exam: Warm, Dry, Other (Skin is somewhat pale as well as gray appearing.)


  ** #1 Interpretation


EKG Date: 11/21/20


Time: 07:55


EKG Interpretation Comments: 





EKG shows a sinus tachycardia rate of 120.  There is no acute ST or T wave 

changes, no ischemia changes.





Course





- Vital Signs


Last Recorded V/S: 


                                Last Vital Signs











Temp  98.1 F   11/21/20 19:52


 


Pulse  119 H  11/21/20 19:52


 


Resp  25 H  11/21/20 19:52


 


BP  101/57 L  11/21/20 19:52


 


Pulse Ox  90 L  11/21/20 19:52














- Orders/Labs/Meds


Orders: 


                               Active Orders 24 hr











 Category Date Time Status


 


 CXR [Chest 1V Frontal] [CR] Stat Exams  11/21/20 20:12 Taken


 


 CULTURE BLOOD [BC] Stat Lab  11/21/20 20:35 Received


 


 CULTURE BLOOD [BC] Stat Lab  11/21/20 20:40 Received


 


 Blood Culture x2 Reflex Set [OM.PC] Stat Oth  11/21/20 20:13 Ordered











Labs: 


                                Laboratory Tests











  11/21/20 11/21/20 11/21/20 Range/Units





  20:00 20:00 20:35 


 


WBC  21.44 H    (4.23-9.07)  K/mm3


 


RBC  3.72 L    (4.63-6.08)  M/mm3


 


Hgb  8.7 L    (13.7-17.5)  gm/dl


 


Hct  29.6 L    (40.1-51.0)  %


 


MCV  79.6  D    (79.0-92.2)  fl


 


MCH  23.4 L    (25.7-32.2)  pg


 


MCHC  29.4 L    (32.2-35.5)  g/dl


 


RDW Std Deviation  59.7 H    (35.1-43.9)  fL


 


Plt Count  418 H    (163-337)  K/mm3


 


MPV  9.8    (9.4-12.3)  fl


 


Neut % (Auto)  86.9 H    (34.0-67.9)  %


 


Lymph % (Auto)  4.7 L    (21.8-53.1)  %


 


Mono % (Auto)  7.6    (5.3-12.2)  %


 


Eos % (Auto)  0 L    (0.8-7.0)  


 


Baso % (Auto)  0.4    (0.1-1.2)  %


 


Neut # (Auto)  18.64 H    (1.78-5.38)  K/mm3


 


Lymph # (Auto)  1.00 L    (1.32-3.57)  K/mm3


 


Mono # (Auto)  1.63 H    (0.30-0.82)  K/mm3


 


Eos # (Auto)  0.00 L    (0.04-0.54)  K/mm3


 


Baso # (Auto)  0.08    (0.01-0.08)  K/mm3


 


Manual Slide Review  Abnormal smear    


 


PT   23.0 H   (9.7-12.0)  SECONDS


 


INR   2.18   


 


Puncture Site     


 


ABG pH     (7.35-7.45)  


 


ABG pCO2     (35.0-45.0)  mmHg


 


ABG pO2     (80.0-100.0)  mmHg


 


ABG HCO3     (22.0-26.0)  meq/L


 


ABG O2 Saturation     (96.0-97.0)  %


 


ABG Base Excess     (-2-2.0)  


 


Parish Test     


 


O2 Delivery Device     


 


Oxygen Flow Rate     


 


Sodium    135 L  (136-145)  mEq/L


 


Potassium    3.6  (3.5-5.1)  mEq/L


 


Chloride    101  ()  mEq/L


 


Carbon Dioxide    20 L  (21-32)  mEq/L


 


Anion Gap    17.6 H  (5-15)  


 


BUN    28 H  (7-18)  mg/dL


 


Creatinine    1.4 H  (0.7-1.3)  mg/dL


 


Est Cr Clr Drug Dosing    58.66  mL/min


 


Estimated GFR (MDRD)    52  (>60)  mL/min


 


BUN/Creatinine Ratio    20.0 H  (14-18)  


 


Glucose    89  ()  mg/dL


 


Lactic Acid     (0.4-2.0)  mmol/L


 


Calcium    9.8  D  (8.5-10.1)  mg/dL


 


Ferritin     ()  ng/ml


 


Total Bilirubin    1.0  (0.2-1.0)  mg/dL


 


AST    110 H  (15-37)  U/L


 


ALT    15 L  (16-63)  U/L


 


Alkaline Phosphatase    147 H  ()  U/L


 


Lactate Dehydrogenase    232 H  ()  U/L


 


Troponin I    < 0.017  (0.00-0.056)  ng/mL


 


C-Reactive Protein    12.8 H*  (<1.0)  mg/dL


 


Total Protein    6.9  (6.4-8.2)  g/dl


 


Albumin    1.4 L  (3.4-5.0)  g/dl


 


Globulin    5.5  gm/dL


 


Albumin/Globulin Ratio    0.3 L  (1-2)  


 


SARS-CoV-2 RNA (VIPUL)     (NEGATIVE)  














  11/21/20 11/21/20 11/21/20 Range/Units





  20:35 20:35 22:14 


 


WBC     (4.23-9.07)  K/mm3


 


RBC     (4.63-6.08)  M/mm3


 


Hgb     (13.7-17.5)  gm/dl


 


Hct     (40.1-51.0)  %


 


MCV     (79.0-92.2)  fl


 


MCH     (25.7-32.2)  pg


 


MCHC     (32.2-35.5)  g/dl


 


RDW Std Deviation     (35.1-43.9)  fL


 


Plt Count     (163-337)  K/mm3


 


MPV     (9.4-12.3)  fl


 


Neut % (Auto)     (34.0-67.9)  %


 


Lymph % (Auto)     (21.8-53.1)  %


 


Mono % (Auto)     (5.3-12.2)  %


 


Eos % (Auto)     (0.8-7.0)  


 


Baso % (Auto)     (0.1-1.2)  %


 


Neut # (Auto)     (1.78-5.38)  K/mm3


 


Lymph # (Auto)     (1.32-3.57)  K/mm3


 


Mono # (Auto)     (0.30-0.82)  K/mm3


 


Eos # (Auto)     (0.04-0.54)  K/mm3


 


Baso # (Auto)     (0.01-0.08)  K/mm3


 


Manual Slide Review     


 


PT     (9.7-12.0)  SECONDS


 


INR     


 


Puncture Site    Lt radial  


 


ABG pH    7.42  (7.35-7.45)  


 


ABG pCO2    27.5 L  (35.0-45.0)  mmHg


 


ABG pO2    58.0 L  (80.0-100.0)  mmHg


 


ABG HCO3    17.3 L  (22.0-26.0)  meq/L


 


ABG O2 Saturation    86.6 L  (96.0-97.0)  %


 


ABG Base Excess    -6.0 L  (-2-2.0)  


 


Parish Test    Positive  


 


O2 Delivery Device    Room air  


 


Oxygen Flow Rate    0.0  


 


Sodium     (136-145)  mEq/L


 


Potassium     (3.5-5.1)  mEq/L


 


Chloride     ()  mEq/L


 


Carbon Dioxide     (21-32)  mEq/L


 


Anion Gap     (5-15)  


 


BUN     (7-18)  mg/dL


 


Creatinine     (0.7-1.3)  mg/dL


 


Est Cr Clr Drug Dosing     mL/min


 


Estimated GFR (MDRD)     (>60)  mL/min


 


BUN/Creatinine Ratio     (14-18)  


 


Glucose     ()  mg/dL


 


Lactic Acid  2.6 H*    (0.4-2.0)  mmol/L


 


Calcium     (8.5-10.1)  mg/dL


 


Ferritin   731 H   ()  ng/ml


 


Total Bilirubin     (0.2-1.0)  mg/dL


 


AST     (15-37)  U/L


 


ALT     (16-63)  U/L


 


Alkaline Phosphatase     ()  U/L


 


Lactate Dehydrogenase     ()  U/L


 


Troponin I     (0.00-0.056)  ng/mL


 


C-Reactive Protein     (<1.0)  mg/dL


 


Total Protein     (6.4-8.2)  g/dl


 


Albumin     (3.4-5.0)  g/dl


 


Globulin     gm/dL


 


Albumin/Globulin Ratio     (1-2)  


 


SARS-CoV-2 RNA (VIPUL)     (NEGATIVE)  














  11/21/20 Range/Units





  22:50 


 


WBC   (4.23-9.07)  K/mm3


 


RBC   (4.63-6.08)  M/mm3


 


Hgb   (13.7-17.5)  gm/dl


 


Hct   (40.1-51.0)  %


 


MCV   (79.0-92.2)  fl


 


MCH   (25.7-32.2)  pg


 


MCHC   (32.2-35.5)  g/dl


 


RDW Std Deviation   (35.1-43.9)  fL


 


Plt Count   (163-337)  K/mm3


 


MPV   (9.4-12.3)  fl


 


Neut % (Auto)   (34.0-67.9)  %


 


Lymph % (Auto)   (21.8-53.1)  %


 


Mono % (Auto)   (5.3-12.2)  %


 


Eos % (Auto)   (0.8-7.0)  


 


Baso % (Auto)   (0.1-1.2)  %


 


Neut # (Auto)   (1.78-5.38)  K/mm3


 


Lymph # (Auto)   (1.32-3.57)  K/mm3


 


Mono # (Auto)   (0.30-0.82)  K/mm3


 


Eos # (Auto)   (0.04-0.54)  K/mm3


 


Baso # (Auto)   (0.01-0.08)  K/mm3


 


Manual Slide Review   


 


PT   (9.7-12.0)  SECONDS


 


INR   


 


Puncture Site   


 


ABG pH   (7.35-7.45)  


 


ABG pCO2   (35.0-45.0)  mmHg


 


ABG pO2   (80.0-100.0)  mmHg


 


ABG HCO3   (22.0-26.0)  meq/L


 


ABG O2 Saturation   (96.0-97.0)  %


 


ABG Base Excess   (-2-2.0)  


 


Parish Test   


 


O2 Delivery Device   


 


Oxygen Flow Rate   


 


Sodium   (136-145)  mEq/L


 


Potassium   (3.5-5.1)  mEq/L


 


Chloride   ()  mEq/L


 


Carbon Dioxide   (21-32)  mEq/L


 


Anion Gap   (5-15)  


 


BUN   (7-18)  mg/dL


 


Creatinine   (0.7-1.3)  mg/dL


 


Est Cr Clr Drug Dosing   mL/min


 


Estimated GFR (MDRD)   (>60)  mL/min


 


BUN/Creatinine Ratio   (14-18)  


 


Glucose   ()  mg/dL


 


Lactic Acid   (0.4-2.0)  mmol/L


 


Calcium   (8.5-10.1)  mg/dL


 


Ferritin   ()  ng/ml


 


Total Bilirubin   (0.2-1.0)  mg/dL


 


AST   (15-37)  U/L


 


ALT   (16-63)  U/L


 


Alkaline Phosphatase   ()  U/L


 


Lactate Dehydrogenase   ()  U/L


 


Troponin I   (0.00-0.056)  ng/mL


 


C-Reactive Protein   (<1.0)  mg/dL


 


Total Protein   (6.4-8.2)  g/dl


 


Albumin   (3.4-5.0)  g/dl


 


Globulin   gm/dL


 


Albumin/Globulin Ratio   (1-2)  


 


SARS-CoV-2 RNA (VIPUL)  Negative  (NEGATIVE)  











Meds: 


Medications














Discontinued Medications














Generic Name Dose Route Start Last Admin





  Trade Name Yury  PRN Reason Stop Dose Admin


 


Sodium Chloride  1,000 mls @ 1,000 mls/hr  11/21/20 20:15  11/21/20 22:15





  Normal Saline  IV   1,000 mls/hr





  ASDIRECTED TELLY   Administration


 


Lactated Ringer's  1,000 mls @ 1,000 mls/hr  11/21/20 21:45  11/21/20 22:15





  Ringers, Lactated  IV   1,000 mls/hr





  ASDIRECTED TELLY   Administration


 


Piperacillin Sod/Tazobactam  100 mls @ 200 mls/hr  11/21/20 22:05  11/21/20 

22:15





  Sod 4.5 gm/ Sodium Chloride  IV  11/21/20 22:34  200 mls/hr





  ONETIME ONE   Administration














- Radiology Interpretation


Free Text/Narrative:: 





Chest x-ray shows extensive thoracic lymphadenopathy with multiple ill-defined 

bilateral pulmonary nodules versus masses that is compatible with malignancy or 

metastatic disease and pneumonia cannot be ruled out.  It would appear that 

there is superimposed pneumonia in the lung bases.





- Re-Assessments/Exams


Free Text/Narrative Re-Assessment/Exam: 





11/21/20 22:15


I spoke to the patient regarding his chest x-ray that shows metastasis as well 

as suggestion of pneumonia.  With a white count of 21 and prerenal azotemia lact

ic acid of 2.6 and hemoglobin of 8.7 he is not able to go home.  Put him on room

 air and his pulse ox drops to 89 or 90% on 2 L goes up to 96%.  He is to be 

admitted for antibiotic treatment as well as for oxygen supplementation.


11/21/20 23:56


The patient's Covid test was negative.  I called [facility] at [facility] and they have agreed to transport the patient down to [place].


11/21/20 23:57


Did speak to Dr. Rawls regarding the patient and he agrees to accept the 

patient provided he is Covid negative.  We will be transporting the patient to 

[facility] since his test is negative.


11/22/20 00:23


The patient has remained stable and is feeling better with fluids, he has been 

transported to [facility] in [place] to be admitted for further evaluation 

and treatment of his pneumonia and his cancer.





Departure





- Departure


Time of Disposition: 23:57


Disposition: DC/Tfer to Acute Hospital [facility]


Condition: Poor


Clinical Impression: 


 Adenocarcinoma carcinomatosis, Dehydration, moderate, Orthostatic hypotension, 

Prerenal azotemia





Metastasis


Qualifiers:


 Area of secondary neoplastic involvement: mediastinum Qualified Code(s): C78.1 

- Secondary malignant neoplasm of mediastinum





Anemia


Qualifiers:


 Anemia type: other cause Other causes of anemia: other cause, not classified 

Qualified Code(s): D64.89 - Other specified anemias





Pneumonia


Qualifiers:


 Pneumonia type: due to unspecified organism Laterality: bilateral Lung 

location: lower lobe of lung Qualified Code(s): J18.9 - Pneumonia, unspecified 

organism





Leukocytosis


Qualifiers:


 Leukocytosis type: unspecified Qualified Code(s): D72.829 - Elevated white 

blood cell count, unspecified








- Discharge Information





Sepsis Event Note (ED)





- Evaluation


Sepsis Screening Result: No Definite Risk





- Focused Exam


Vital Signs: 


                                   Vital Signs











  Temp Pulse Resp BP Pulse Ox


 


 11/21/20 19:52  98.1 F  119 H  25 H  101/57 L  90 L














ED Communication





- ED Communication Date/Time


Date: 11/21/20


Time Called: 11:55





- Discussed Case With (1)


Discussed Case With (1): Admitting Provider


Person/s Notified (1): Dr. Rawls (He agrees to accept the patient to [facility] for further evaluation and treatment.)





- My Orders


Last 24 Hours: 


My Active Orders





11/21/20 20:12


CXR [Chest 1V Frontal] [CR] Stat 





11/21/20 20:13


Blood Culture x2 Reflex Set [OM.PC] Stat 





11/21/20 20:35


CULTURE BLOOD [BC] Stat 





11/21/20 20:40


CULTURE BLOOD [BC] Stat 














- Assessment/Plan


Last 24 Hours: 


My Active Orders





11/21/20 20:12


CXR [Chest 1V Frontal] [CR] Stat 





11/21/20 20:13


Blood Culture x2 Reflex Set [OM.PC] Stat 





11/21/20 20:35


CULTURE BLOOD [BC] Stat 





11/21/20 20:40


CULTURE BLOOD [BC] Stat